# Patient Record
Sex: FEMALE | Race: WHITE | NOT HISPANIC OR LATINO
[De-identification: names, ages, dates, MRNs, and addresses within clinical notes are randomized per-mention and may not be internally consistent; named-entity substitution may affect disease eponyms.]

---

## 2018-01-21 PROBLEM — Z00.00 ENCOUNTER FOR PREVENTIVE HEALTH EXAMINATION: Status: ACTIVE | Noted: 2018-01-21

## 2018-01-24 ENCOUNTER — RECORD ABSTRACTING (OUTPATIENT)
Age: 63
End: 2018-01-24

## 2018-01-24 DIAGNOSIS — Z78.9 OTHER SPECIFIED HEALTH STATUS: ICD-10-CM

## 2018-01-24 DIAGNOSIS — Z82.49 FAMILY HISTORY OF ISCHEMIC HEART DISEASE AND OTHER DISEASES OF THE CIRCULATORY SYSTEM: ICD-10-CM

## 2018-01-24 DIAGNOSIS — Z91.89 OTHER SPECIFIED PERSONAL RISK FACTORS, NOT ELSEWHERE CLASSIFIED: ICD-10-CM

## 2018-01-24 DIAGNOSIS — G47.00 INSOMNIA, UNSPECIFIED: ICD-10-CM

## 2018-05-01 ENCOUNTER — RECORD ABSTRACTING (OUTPATIENT)
Age: 63
End: 2018-05-01

## 2018-05-01 ENCOUNTER — APPOINTMENT (OUTPATIENT)
Dept: CARDIOLOGY | Facility: CLINIC | Age: 63
End: 2018-05-01
Payer: COMMERCIAL

## 2018-05-01 VITALS
BODY MASS INDEX: 25.27 KG/M2 | HEART RATE: 77 BPM | WEIGHT: 148 LBS | DIASTOLIC BLOOD PRESSURE: 74 MMHG | HEIGHT: 64 IN | RESPIRATION RATE: 14 BRPM | SYSTOLIC BLOOD PRESSURE: 122 MMHG

## 2018-05-01 PROCEDURE — 99214 OFFICE O/P EST MOD 30 MIN: CPT

## 2018-05-01 PROCEDURE — 93000 ELECTROCARDIOGRAM COMPLETE: CPT

## 2018-05-01 RX ORDER — MULTIVITAMIN
CAPSULE ORAL
Refills: 0 | Status: ACTIVE | COMMUNITY

## 2018-05-01 RX ORDER — ZOLPIDEM TARTRATE 5 MG/1
5 TABLET, FILM COATED ORAL
Refills: 0 | Status: ACTIVE | COMMUNITY

## 2018-05-03 ENCOUNTER — MEDICATION RENEWAL (OUTPATIENT)
Age: 63
End: 2018-05-03

## 2018-10-01 ENCOUNTER — RX RENEWAL (OUTPATIENT)
Age: 63
End: 2018-10-01

## 2018-11-01 ENCOUNTER — APPOINTMENT (OUTPATIENT)
Dept: CARDIOLOGY | Facility: CLINIC | Age: 63
End: 2018-11-01
Payer: COMMERCIAL

## 2018-11-01 VITALS
DIASTOLIC BLOOD PRESSURE: 64 MMHG | HEIGHT: 64 IN | BODY MASS INDEX: 24.75 KG/M2 | SYSTOLIC BLOOD PRESSURE: 128 MMHG | HEART RATE: 83 BPM | WEIGHT: 145 LBS | RESPIRATION RATE: 14 BRPM

## 2018-11-01 DIAGNOSIS — R07.9 CHEST PAIN, UNSPECIFIED: ICD-10-CM

## 2018-11-01 DIAGNOSIS — Z86.79 PERSONAL HISTORY OF OTHER DISEASES OF THE CIRCULATORY SYSTEM: ICD-10-CM

## 2018-11-01 PROCEDURE — 93000 ELECTROCARDIOGRAM COMPLETE: CPT

## 2018-11-01 PROCEDURE — 99214 OFFICE O/P EST MOD 30 MIN: CPT

## 2018-11-01 RX ORDER — ROSUVASTATIN CALCIUM 10 MG/1
10 TABLET, FILM COATED ORAL
Qty: 90 | Refills: 3 | Status: DISCONTINUED | COMMUNITY
Start: 2018-10-01 | End: 2018-11-01

## 2018-12-24 ENCOUNTER — RX RENEWAL (OUTPATIENT)
Age: 63
End: 2018-12-24

## 2019-03-12 ENCOUNTER — APPOINTMENT (OUTPATIENT)
Dept: CARDIOLOGY | Facility: CLINIC | Age: 64
End: 2019-03-12
Payer: COMMERCIAL

## 2019-03-12 PROCEDURE — 93306 TTE W/DOPPLER COMPLETE: CPT

## 2019-03-12 PROCEDURE — 93880 EXTRACRANIAL BILAT STUDY: CPT

## 2019-04-08 ENCOUNTER — NON-APPOINTMENT (OUTPATIENT)
Age: 64
End: 2019-04-08

## 2019-04-08 ENCOUNTER — APPOINTMENT (OUTPATIENT)
Dept: CARDIOLOGY | Facility: CLINIC | Age: 64
End: 2019-04-08
Payer: COMMERCIAL

## 2019-04-08 VITALS
SYSTOLIC BLOOD PRESSURE: 130 MMHG | BODY MASS INDEX: 25.1 KG/M2 | HEIGHT: 64 IN | WEIGHT: 147 LBS | DIASTOLIC BLOOD PRESSURE: 78 MMHG | RESPIRATION RATE: 14 BRPM | HEART RATE: 87 BPM

## 2019-04-08 PROCEDURE — 93000 ELECTROCARDIOGRAM COMPLETE: CPT

## 2019-04-08 PROCEDURE — 99214 OFFICE O/P EST MOD 30 MIN: CPT

## 2019-04-08 RX ORDER — CALCIUM 250 MG
TABLET ORAL
Refills: 0 | Status: ACTIVE | COMMUNITY

## 2019-04-08 NOTE — REASON FOR VISIT
[Follow-Up - Clinic] : a clinic follow-up of [FreeTextEntry1] : The patient is a 63-year-old woman who has a history of mild carotid plaquing stenosis and hypertension as well as hyperlipidemia who presents back to the office today for general cardiac checkup;\par \par She reports that she has been largely asymptomatic for chest pain, shortness of breath, palpitations, dizziness or syncope;\par \par

## 2019-04-08 NOTE — ASSESSMENT
[FreeTextEntry1] : EKG demonstrates normal sinus rhythm at a rate 87. There is some nonspecific ST-T changes but nonacute and unchanged from previous;\par \par Transthoracic echo study demonstrated normal preserved cardiac chambers and normal systolic function with LV EF of 60%. Mild aortic sclerosis without stenosis, mild thickening of the mitral leaflets no significant valvulopathy seen;\par \par Carotid duplex study from 3/12/19 demonstrates mild carotid plaquing stenosis on the left with mild- moderate heterogeneous plaquing on the right, with normal flow;\par \par in summary the patient is a 63-year-old woman who has a history of hypertension and hyperlipidemia which has been stable on her current medical regimen;\par \par \par Plan:\par \par Patient encouraged to continue current medical regimen\par \par No additional cardiac workup indicated at this time, however discuss possibility of scheduling a nuclear stress test at next visit sometime later in the fall;\par \par Followup in this office within 6 months or p.r.n.\par \par ;Patient to report any untoward chest symptoms between now and next visit;;\par

## 2019-04-08 NOTE — HISTORY OF PRESENT ILLNESS
[FreeTextEntry1] : She also underwent a recent echocardiogram and carotid duplex study here to discuss those results;

## 2019-04-08 NOTE — PHYSICAL EXAM
[General Appearance - Well Developed] : well developed [Normal Appearance] : normal appearance [Well Groomed] : well groomed [General Appearance - Well Nourished] : well nourished [No Deformities] : no deformities [General Appearance - In No Acute Distress] : no acute distress [Normal Conjunctiva] : the conjunctiva exhibited no abnormalities [Eyelids - No Xanthelasma] : the eyelids demonstrated no xanthelasmas [Normal Oral Mucosa] : normal oral mucosa [No Oral Pallor] : no oral pallor [No Oral Cyanosis] : no oral cyanosis [Normal Jugular Venous A Waves Present] : normal jugular venous A waves present [Normal Jugular Venous V Waves Present] : normal jugular venous V waves present [No Jugular Venous Damian A Waves] : no jugular venous damian A waves [Respiration, Rhythm And Depth] : normal respiratory rhythm and effort [Exaggerated Use Of Accessory Muscles For Inspiration] : no accessory muscle use [Auscultation Breath Sounds / Voice Sounds] : lungs were clear to auscultation bilaterally [FreeTextEntry1] : Regular rhythm grade 1/6 systolic murmur [Abdomen Soft] : soft [Abdomen Tenderness] : non-tender [Abdomen Mass (___ Cm)] : no abdominal mass palpated [Abnormal Walk] : normal gait [Gait - Sufficient For Exercise Testing] : the gait was sufficient for exercise testing [Nail Clubbing] : no clubbing of the fingernails [Cyanosis, Localized] : no localized cyanosis [Petechial Hemorrhages (___cm)] : no petechial hemorrhages [Skin Color & Pigmentation] : normal skin color and pigmentation [] : no rash [No Venous Stasis] : no venous stasis [Skin Lesions] : no skin lesions [No Skin Ulcers] : no skin ulcer [No Xanthoma] : no  xanthoma was observed [Oriented To Time, Place, And Person] : oriented to person, place, and time [Affect] : the affect was normal [Mood] : the mood was normal [No Anxiety] : not feeling anxious

## 2019-06-13 ENCOUNTER — MEDICATION RENEWAL (OUTPATIENT)
Age: 64
End: 2019-06-13

## 2019-08-13 ENCOUNTER — APPOINTMENT (OUTPATIENT)
Dept: COLORECTAL SURGERY | Facility: CLINIC | Age: 64
End: 2019-08-13
Payer: COMMERCIAL

## 2019-08-13 VITALS
WEIGHT: 144 LBS | DIASTOLIC BLOOD PRESSURE: 79 MMHG | HEIGHT: 64 IN | RESPIRATION RATE: 14 BRPM | BODY MASS INDEX: 24.59 KG/M2 | SYSTOLIC BLOOD PRESSURE: 122 MMHG | HEART RATE: 66 BPM

## 2019-08-13 DIAGNOSIS — Z87.448 PERSONAL HISTORY OF OTHER DISEASES OF URINARY SYSTEM: ICD-10-CM

## 2019-08-13 DIAGNOSIS — Z12.11 ENCOUNTER FOR SCREENING FOR MALIGNANT NEOPLASM OF COLON: ICD-10-CM

## 2019-08-13 DIAGNOSIS — Z87.440 PERSONAL HISTORY OF URINARY (TRACT) INFECTIONS: ICD-10-CM

## 2019-08-13 PROCEDURE — 99243 OFF/OP CNSLTJ NEW/EST LOW 30: CPT

## 2019-08-13 NOTE — CONSULT LETTER
[Dear  ___] : Dear  [unfilled], [Consult Letter:] : I had the pleasure of evaluating your patient, [unfilled]. Sacral pressure injury - discontinue Dakin's 1/4 strength daily dressing. Cleanse wound with wound cleanser, pack lightly with Aquacel AG, cover with foam dressing daily.   Discussed assessment with [facility] staff, Dr Garcia. [Please see my note below.] : Please see my note below. Sacral pressure injury - discontinue Dakin's 1/4 strength daily dressing. Irrigate wound with wound cleanser, apply Cavilon liquid skin barrier to periwound, pack wound lightly with Aquacel AG, cover with foam dressing daily.   Discussed assessment with [facility] staff, Dr Garcia. [Consult Closing:] : Thank you very much for allowing me to participate in the care of this patient.  If you have any questions, please do not hesitate to contact me. [Sincerely,] : Sincerely, [FreeTextEntry1] : She is in need of a screening colonoscopy which will be scheduled in the near future and you would be notified of the results. [FreeTextEntry3] : Toby High MD\par

## 2019-08-13 NOTE — REVIEW OF SYSTEMS
[Negative] : Heme/Lymph [Anxiety] : no anxiety [de-identified] : insomnia [FreeTextEntry6] : sleep apnea

## 2019-08-13 NOTE — HISTORY OF PRESENT ILLNESS
[FreeTextEntry1] : 64-year-old female who presents for consultation for screening colonoscopy. She denies any gastrointestinal symptoms such as abdominal pain, nausea, vomiting, changes in bowel movements, melena or hematochezia. She has never had a colonoscopy.

## 2019-08-13 NOTE — PHYSICAL EXAM
[Respiratory Effort] : normal respiratory effort [Normal Rate and Rhythm] : normal rate and rhythm [Calm] : calm [de-identified] : soft, nontender, nondistended. No mass or hernia appreciated. [de-identified] : normocephalic, atraumatic [de-identified] : well appearing, in no distress [de-identified] : moves extremities without difficulty [de-identified] : warm and dry [de-identified] : alert and oriented x3

## 2019-09-17 ENCOUNTER — RX RENEWAL (OUTPATIENT)
Age: 64
End: 2019-09-17

## 2019-09-26 ENCOUNTER — NON-APPOINTMENT (OUTPATIENT)
Age: 64
End: 2019-09-26

## 2019-09-26 ENCOUNTER — APPOINTMENT (OUTPATIENT)
Dept: CARDIOLOGY | Facility: CLINIC | Age: 64
End: 2019-09-26
Payer: COMMERCIAL

## 2019-09-26 VITALS
HEIGHT: 64 IN | RESPIRATION RATE: 16 BRPM | DIASTOLIC BLOOD PRESSURE: 73 MMHG | BODY MASS INDEX: 24.75 KG/M2 | WEIGHT: 145 LBS | SYSTOLIC BLOOD PRESSURE: 121 MMHG | HEART RATE: 76 BPM

## 2019-09-26 PROCEDURE — 93000 ELECTROCARDIOGRAM COMPLETE: CPT

## 2019-09-26 PROCEDURE — 99214 OFFICE O/P EST MOD 30 MIN: CPT

## 2019-09-26 NOTE — HISTORY OF PRESENT ILLNESS
[FreeTextEntry1] : Patient reminds me she also has a family history for heart disease for her mother.\par \par She is tolerating her current medical regimen without difficulty;\par \par Additional somatic complaints include symptoms of sciatica and bursitis which she is dealing with as well;;\par \par

## 2019-09-26 NOTE — REASON FOR VISIT
[Follow-Up - Clinic] : a clinic follow-up of [FreeTextEntry1] : The patient is a 64-year-old white female with a known history for mild carotid plaquing, mild hypertension and hyperlipidemia who presents back to the office today for general cardiac checkup\par \par Recently, she was recommended to start on a CPAP machine for her history of mild obstructive sleep apnea with superimposed insomnia issues.\par She is trying to get used to this.\par \par She denies any recent symptoms of chest pain but gets occasional exertional dyspnea. No palpitations, dizziness or syncope;;;

## 2019-09-26 NOTE — PHYSICAL EXAM
[General Appearance - Well Developed] : well developed [Normal Appearance] : normal appearance [Well Groomed] : well groomed [General Appearance - Well Nourished] : well nourished [No Deformities] : no deformities [General Appearance - In No Acute Distress] : no acute distress [Normal Conjunctiva] : the conjunctiva exhibited no abnormalities [Eyelids - No Xanthelasma] : the eyelids demonstrated no xanthelasmas [Normal Oral Mucosa] : normal oral mucosa [No Oral Pallor] : no oral pallor [Normal Jugular Venous A Waves Present] : normal jugular venous A waves present [No Oral Cyanosis] : no oral cyanosis [Normal Jugular Venous V Waves Present] : normal jugular venous V waves present [No Jugular Venous Damian A Waves] : no jugular venous damian A waves [Respiration, Rhythm And Depth] : normal respiratory rhythm and effort [Exaggerated Use Of Accessory Muscles For Inspiration] : no accessory muscle use [Auscultation Breath Sounds / Voice Sounds] : lungs were clear to auscultation bilaterally [FreeTextEntry1] : Regular rhythm grade 1/6 systolic murmur [Abdomen Soft] : soft [Abdomen Tenderness] : non-tender [Abdomen Mass (___ Cm)] : no abdominal mass palpated [Abnormal Walk] : normal gait [Nail Clubbing] : no clubbing of the fingernails [Gait - Sufficient For Exercise Testing] : the gait was sufficient for exercise testing [Cyanosis, Localized] : no localized cyanosis [Petechial Hemorrhages (___cm)] : no petechial hemorrhages [Skin Color & Pigmentation] : normal skin color and pigmentation [] : no rash [No Venous Stasis] : no venous stasis [Skin Lesions] : no skin lesions [No Skin Ulcers] : no skin ulcer [No Xanthoma] : no  xanthoma was observed [Oriented To Time, Place, And Person] : oriented to person, place, and time [Mood] : the mood was normal [Affect] : the affect was normal [No Anxiety] : not feeling anxious

## 2019-10-29 ENCOUNTER — OUTPATIENT (OUTPATIENT)
Dept: OUTPATIENT SERVICES | Facility: HOSPITAL | Age: 64
LOS: 1 days | End: 2019-10-29
Payer: COMMERCIAL

## 2019-10-29 VITALS
DIASTOLIC BLOOD PRESSURE: 73 MMHG | OXYGEN SATURATION: 99 % | HEART RATE: 67 BPM | TEMPERATURE: 98 F | SYSTOLIC BLOOD PRESSURE: 164 MMHG | WEIGHT: 139.99 LBS | RESPIRATION RATE: 20 BRPM | HEIGHT: 64 IN

## 2019-10-29 DIAGNOSIS — Z01.818 ENCOUNTER FOR OTHER PREPROCEDURAL EXAMINATION: ICD-10-CM

## 2019-10-29 DIAGNOSIS — Z98.890 OTHER SPECIFIED POSTPROCEDURAL STATES: Chronic | ICD-10-CM

## 2019-10-29 DIAGNOSIS — Z12.11 ENCOUNTER FOR SCREENING FOR MALIGNANT NEOPLASM OF COLON: ICD-10-CM

## 2019-10-29 DIAGNOSIS — Z90.89 ACQUIRED ABSENCE OF OTHER ORGANS: Chronic | ICD-10-CM

## 2019-10-29 LAB
ANION GAP SERPL CALC-SCNC: 4 MMOL/L — LOW (ref 5–17)
APTT BLD: 33.1 SEC — SIGNIFICANT CHANGE UP (ref 27.5–36.3)
BASOPHILS # BLD AUTO: 0.05 K/UL — SIGNIFICANT CHANGE UP (ref 0–0.2)
BASOPHILS NFR BLD AUTO: 0.7 % — SIGNIFICANT CHANGE UP (ref 0–2)
BUN SERPL-MCNC: 21 MG/DL — SIGNIFICANT CHANGE UP (ref 7–23)
CALCIUM SERPL-MCNC: 9.5 MG/DL — SIGNIFICANT CHANGE UP (ref 8.5–10.1)
CHLORIDE SERPL-SCNC: 107 MMOL/L — SIGNIFICANT CHANGE UP (ref 96–108)
CO2 SERPL-SCNC: 29 MMOL/L — SIGNIFICANT CHANGE UP (ref 22–31)
CREAT SERPL-MCNC: 1.04 MG/DL — SIGNIFICANT CHANGE UP (ref 0.5–1.3)
EOSINOPHIL # BLD AUTO: 0.11 K/UL — SIGNIFICANT CHANGE UP (ref 0–0.5)
EOSINOPHIL NFR BLD AUTO: 1.6 % — SIGNIFICANT CHANGE UP (ref 0–6)
GLUCOSE SERPL-MCNC: 111 MG/DL — HIGH (ref 70–99)
HCT VFR BLD CALC: 39.8 % — SIGNIFICANT CHANGE UP (ref 34.5–45)
HGB BLD-MCNC: 13.6 G/DL — SIGNIFICANT CHANGE UP (ref 11.5–15.5)
IMM GRANULOCYTES NFR BLD AUTO: 0.3 % — SIGNIFICANT CHANGE UP (ref 0–1.5)
INR BLD: 0.94 RATIO — SIGNIFICANT CHANGE UP (ref 0.88–1.16)
LYMPHOCYTES # BLD AUTO: 1.3 K/UL — SIGNIFICANT CHANGE UP (ref 1–3.3)
LYMPHOCYTES # BLD AUTO: 19 % — SIGNIFICANT CHANGE UP (ref 13–44)
MCHC RBC-ENTMCNC: 31.6 PG — SIGNIFICANT CHANGE UP (ref 27–34)
MCHC RBC-ENTMCNC: 34.2 GM/DL — SIGNIFICANT CHANGE UP (ref 32–36)
MCV RBC AUTO: 92.6 FL — SIGNIFICANT CHANGE UP (ref 80–100)
MONOCYTES # BLD AUTO: 0.49 K/UL — SIGNIFICANT CHANGE UP (ref 0–0.9)
MONOCYTES NFR BLD AUTO: 7.2 % — SIGNIFICANT CHANGE UP (ref 2–14)
NEUTROPHILS # BLD AUTO: 4.87 K/UL — SIGNIFICANT CHANGE UP (ref 1.8–7.4)
NEUTROPHILS NFR BLD AUTO: 71.2 % — SIGNIFICANT CHANGE UP (ref 43–77)
PLATELET # BLD AUTO: 238 K/UL — SIGNIFICANT CHANGE UP (ref 150–400)
POTASSIUM SERPL-MCNC: 3.9 MMOL/L — SIGNIFICANT CHANGE UP (ref 3.5–5.3)
POTASSIUM SERPL-SCNC: 3.9 MMOL/L — SIGNIFICANT CHANGE UP (ref 3.5–5.3)
PROTHROM AB SERPL-ACNC: 10.4 SEC — SIGNIFICANT CHANGE UP (ref 10–12.9)
RBC # BLD: 4.3 M/UL — SIGNIFICANT CHANGE UP (ref 3.8–5.2)
RBC # FLD: 12.7 % — SIGNIFICANT CHANGE UP (ref 10.3–14.5)
SODIUM SERPL-SCNC: 140 MMOL/L — SIGNIFICANT CHANGE UP (ref 135–145)
WBC # BLD: 6.84 K/UL — SIGNIFICANT CHANGE UP (ref 3.8–10.5)
WBC # FLD AUTO: 6.84 K/UL — SIGNIFICANT CHANGE UP (ref 3.8–10.5)

## 2019-10-29 PROCEDURE — 85730 THROMBOPLASTIN TIME PARTIAL: CPT

## 2019-10-29 PROCEDURE — G0463: CPT | Mod: 25

## 2019-10-29 PROCEDURE — 93010 ELECTROCARDIOGRAM REPORT: CPT

## 2019-10-29 PROCEDURE — 36415 COLL VENOUS BLD VENIPUNCTURE: CPT

## 2019-10-29 PROCEDURE — 80048 BASIC METABOLIC PNL TOTAL CA: CPT

## 2019-10-29 PROCEDURE — 85025 COMPLETE CBC W/AUTO DIFF WBC: CPT

## 2019-10-29 PROCEDURE — 93005 ELECTROCARDIOGRAM TRACING: CPT

## 2019-10-29 PROCEDURE — 85610 PROTHROMBIN TIME: CPT

## 2019-10-29 NOTE — H&P PST ADULT - NSICDXPASTMEDICALHX_GEN_ALL_CORE_FT
PAST MEDICAL HISTORY:  GERD (gastroesophageal reflux disease)     H/O sciatica     HTN (hypertension)     Hypercholesterolemia     Lumbar disc herniation     Migraine     Sleep apnea CPAP

## 2019-10-29 NOTE — H&P PST ADULT - ASSESSMENT
Plan:  1. NPO post midnight  2. Follow bowel prep instructions from Dr. High  3. Follow preop medication instructions from Dr. High  4. Labs, EKG as per Dr. High 63 y/o female scheduled for screening colonoscopy.  Plan:  1. NPO post midnight  2. Follow bowel prep instructions from Dr. High  3. Follow preop medication instructions from Dr. High  4. Labs, EKG as per Dr. High

## 2019-10-29 NOTE — H&P PST ADULT - EXTREMITIES
Duration Of Freeze Thaw-Cycle (Seconds): 10
Render Post-Care Instructions In Note?: no
Consent: The patient's consent was obtained including but not limited to risks of crusting, scabbing, blistering, scarring, darker or lighter pigmentary change, recurrence, incomplete removal and infection.
Post-Care Instructions: I reviewed with the patient in detail post-care instructions. Patient is to wear sunprotection, and avoid picking at any of the treated lesions. Pt may apply Vaseline to crusted or scabbing areas.
Number Of Freeze-Thaw Cycles: 2 freeze-thaw cycles
Detail Level: Detailed
No cyanosis, clubbing or edema

## 2019-10-29 NOTE — H&P PST ADULT - WEIGHT IN KG
Shyam Craig, I am forwarding my most recent visit note for Pawel Barajas as an update. She is doing better, but still has a way to go. Boni 64.5

## 2019-10-29 NOTE — H&P PST ADULT - NSICDXPASTSURGICALHX_GEN_ALL_CORE_FT
PAST SURGICAL HISTORY:  H/O arthroscopy of right knee     H/O breast biopsy 2003 left    S/P rotator cuff repair left    S/P tonsillectomy 2008

## 2019-10-29 NOTE — H&P PST ADULT - NSICDXFAMILYHX_GEN_ALL_CORE_FT
FAMILY HISTORY:  Family history of diabetes mellitus (DM), father and sister  Family history of heart attack, mother  Family history of lymphoma, father  Family history of skin cancer, brother

## 2019-10-30 DIAGNOSIS — Z12.11 ENCOUNTER FOR SCREENING FOR MALIGNANT NEOPLASM OF COLON: ICD-10-CM

## 2019-10-30 DIAGNOSIS — Z01.818 ENCOUNTER FOR OTHER PREPROCEDURAL EXAMINATION: ICD-10-CM

## 2019-11-04 PROBLEM — M51.26 OTHER INTERVERTEBRAL DISC DISPLACEMENT, LUMBAR REGION: Chronic | Status: ACTIVE | Noted: 2019-10-29

## 2019-11-04 PROBLEM — G43.909 MIGRAINE, UNSPECIFIED, NOT INTRACTABLE, WITHOUT STATUS MIGRAINOSUS: Chronic | Status: ACTIVE | Noted: 2019-10-29

## 2019-11-04 PROBLEM — Z86.69 PERSONAL HISTORY OF OTHER DISEASES OF THE NERVOUS SYSTEM AND SENSE ORGANS: Chronic | Status: ACTIVE | Noted: 2019-10-29

## 2019-11-04 PROBLEM — K21.9 GASTRO-ESOPHAGEAL REFLUX DISEASE WITHOUT ESOPHAGITIS: Chronic | Status: ACTIVE | Noted: 2019-10-29

## 2019-11-04 PROBLEM — G47.30 SLEEP APNEA, UNSPECIFIED: Chronic | Status: ACTIVE | Noted: 2019-10-29

## 2019-11-04 PROBLEM — I10 ESSENTIAL (PRIMARY) HYPERTENSION: Chronic | Status: ACTIVE | Noted: 2019-10-29

## 2019-11-04 PROBLEM — E78.00 PURE HYPERCHOLESTEROLEMIA, UNSPECIFIED: Chronic | Status: ACTIVE | Noted: 2019-10-29

## 2019-11-13 ENCOUNTER — RESULT REVIEW (OUTPATIENT)
Age: 64
End: 2019-11-13

## 2019-11-13 ENCOUNTER — APPOINTMENT (OUTPATIENT)
Dept: COLORECTAL SURGERY | Facility: HOSPITAL | Age: 64
End: 2019-11-13
Payer: COMMERCIAL

## 2019-11-13 ENCOUNTER — OUTPATIENT (OUTPATIENT)
Dept: OUTPATIENT SERVICES | Facility: HOSPITAL | Age: 64
LOS: 1 days | Discharge: HOME | End: 2019-11-13
Payer: COMMERCIAL

## 2019-11-13 VITALS
TEMPERATURE: 98 F | OXYGEN SATURATION: 99 % | DIASTOLIC BLOOD PRESSURE: 83 MMHG | WEIGHT: 136.91 LBS | SYSTOLIC BLOOD PRESSURE: 129 MMHG | HEIGHT: 64 IN | HEART RATE: 69 BPM | RESPIRATION RATE: 26 BRPM

## 2019-11-13 DIAGNOSIS — Z98.890 OTHER SPECIFIED POSTPROCEDURAL STATES: Chronic | ICD-10-CM

## 2019-11-13 DIAGNOSIS — Z90.89 ACQUIRED ABSENCE OF OTHER ORGANS: Chronic | ICD-10-CM

## 2019-11-13 DIAGNOSIS — Z12.11 ENCOUNTER FOR SCREENING FOR MALIGNANT NEOPLASM OF COLON: ICD-10-CM

## 2019-11-13 PROCEDURE — 88305 TISSUE EXAM BY PATHOLOGIST: CPT

## 2019-11-13 PROCEDURE — 88305 TISSUE EXAM BY PATHOLOGIST: CPT | Mod: 26

## 2019-11-13 PROCEDURE — 45385 COLONOSCOPY W/LESION REMOVAL: CPT

## 2019-11-13 RX ORDER — ASCORBIC ACID 60 MG
1 TABLET,CHEWABLE ORAL
Qty: 0 | Refills: 0 | DISCHARGE

## 2019-11-13 RX ORDER — ZOLPIDEM TARTRATE 10 MG/1
1 TABLET ORAL
Qty: 0 | Refills: 0 | DISCHARGE

## 2019-11-13 RX ORDER — AMLODIPINE BESYLATE AND BENAZEPRIL HYDROCHLORIDE 10; 20 MG/1; MG/1
1 CAPSULE ORAL
Qty: 0 | Refills: 0 | DISCHARGE

## 2019-11-13 RX ORDER — CHOLECALCIFEROL (VITAMIN D3) 125 MCG
1 CAPSULE ORAL
Qty: 0 | Refills: 0 | DISCHARGE

## 2019-11-13 RX ORDER — SODIUM CHLORIDE 9 MG/ML
1000 INJECTION INTRAMUSCULAR; INTRAVENOUS; SUBCUTANEOUS
Refills: 0 | Status: DISCONTINUED | OUTPATIENT
Start: 2019-11-13 | End: 2019-11-13

## 2019-11-13 RX ORDER — ROSUVASTATIN CALCIUM 5 MG/1
1 TABLET ORAL
Qty: 0 | Refills: 0 | DISCHARGE

## 2019-11-13 NOTE — ASU PATIENT PROFILE, ADULT - PSH
H/O arthroscopy of right knee    H/O breast biopsy  2003 left  S/P rotator cuff repair  left  S/P tonsillectomy  2008

## 2019-11-13 NOTE — ASU PATIENT PROFILE, ADULT - PMH
GERD (gastroesophageal reflux disease)    H/O sciatica    HTN (hypertension)    Hypercholesterolemia    Lumbar disc herniation    Migraine    Sleep apnea  CPAP

## 2019-11-16 DIAGNOSIS — Z12.11 ENCOUNTER FOR SCREENING FOR MALIGNANT NEOPLASM OF COLON: ICD-10-CM

## 2019-11-16 DIAGNOSIS — M51.36 OTHER INTERVERTEBRAL DISC DEGENERATION, LUMBAR REGION: ICD-10-CM

## 2019-11-16 DIAGNOSIS — G47.33 OBSTRUCTIVE SLEEP APNEA (ADULT) (PEDIATRIC): ICD-10-CM

## 2019-11-16 DIAGNOSIS — I10 ESSENTIAL (PRIMARY) HYPERTENSION: ICD-10-CM

## 2019-11-16 DIAGNOSIS — D12.5 BENIGN NEOPLASM OF SIGMOID COLON: ICD-10-CM

## 2019-11-16 DIAGNOSIS — G43.909 MIGRAINE, UNSPECIFIED, NOT INTRACTABLE, WITHOUT STATUS MIGRAINOSUS: ICD-10-CM

## 2019-11-16 DIAGNOSIS — K21.9 GASTRO-ESOPHAGEAL REFLUX DISEASE WITHOUT ESOPHAGITIS: ICD-10-CM

## 2019-11-16 DIAGNOSIS — E78.00 PURE HYPERCHOLESTEROLEMIA, UNSPECIFIED: ICD-10-CM

## 2019-11-16 DIAGNOSIS — Z88.0 ALLERGY STATUS TO PENICILLIN: ICD-10-CM

## 2019-11-16 DIAGNOSIS — K57.30 DIVERTICULOSIS OF LARGE INTESTINE WITHOUT PERFORATION OR ABSCESS WITHOUT BLEEDING: ICD-10-CM

## 2019-11-16 DIAGNOSIS — D12.0 BENIGN NEOPLASM OF CECUM: ICD-10-CM

## 2019-11-16 DIAGNOSIS — K64.4 RESIDUAL HEMORRHOIDAL SKIN TAGS: ICD-10-CM

## 2019-11-20 ENCOUNTER — OTHER (OUTPATIENT)
Age: 64
End: 2019-11-20

## 2019-12-12 ENCOUNTER — APPOINTMENT (OUTPATIENT)
Dept: CARDIOLOGY | Facility: CLINIC | Age: 64
End: 2019-12-12
Payer: COMMERCIAL

## 2019-12-12 PROCEDURE — A9500: CPT

## 2019-12-12 PROCEDURE — 78452 HT MUSCLE IMAGE SPECT MULT: CPT

## 2019-12-12 PROCEDURE — 93015 CV STRESS TEST SUPVJ I&R: CPT

## 2019-12-18 RX ORDER — KIT FOR THE PREPARATION OF TECHNETIUM TC99M SESTAMIBI 1 MG/5ML
INJECTION, POWDER, LYOPHILIZED, FOR SOLUTION PARENTERAL
Refills: 0 | Status: COMPLETED | OUTPATIENT
Start: 2019-12-18

## 2019-12-18 RX ADMIN — KIT FOR THE PREPARATION OF TECHNETIUM TC99M SESTAMIBI 0: 1 INJECTION, POWDER, LYOPHILIZED, FOR SOLUTION PARENTERAL at 00:00

## 2020-03-13 ENCOUNTER — RX RENEWAL (OUTPATIENT)
Age: 65
End: 2020-03-13

## 2020-06-08 ENCOUNTER — RX RENEWAL (OUTPATIENT)
Age: 65
End: 2020-06-08

## 2020-09-03 ENCOUNTER — NON-APPOINTMENT (OUTPATIENT)
Age: 65
End: 2020-09-03

## 2020-09-03 ENCOUNTER — APPOINTMENT (OUTPATIENT)
Dept: CARDIOLOGY | Facility: CLINIC | Age: 65
End: 2020-09-03
Payer: COMMERCIAL

## 2020-09-03 VITALS
HEIGHT: 65 IN | WEIGHT: 128 LBS | DIASTOLIC BLOOD PRESSURE: 70 MMHG | RESPIRATION RATE: 16 BRPM | HEART RATE: 80 BPM | TEMPERATURE: 98.3 F | SYSTOLIC BLOOD PRESSURE: 139 MMHG | BODY MASS INDEX: 21.33 KG/M2

## 2020-09-03 PROCEDURE — 93000 ELECTROCARDIOGRAM COMPLETE: CPT

## 2020-09-03 PROCEDURE — 99214 OFFICE O/P EST MOD 30 MIN: CPT

## 2020-09-03 NOTE — PHYSICAL EXAM
[Normal Appearance] : normal appearance [General Appearance - Well Developed] : well developed [Well Groomed] : well groomed [General Appearance - Well Nourished] : well nourished [Normal Conjunctiva] : the conjunctiva exhibited no abnormalities [General Appearance - In No Acute Distress] : no acute distress [No Deformities] : no deformities [Eyelids - No Xanthelasma] : the eyelids demonstrated no xanthelasmas [Normal Oral Mucosa] : normal oral mucosa [No Oral Pallor] : no oral pallor [No Oral Cyanosis] : no oral cyanosis [Normal Jugular Venous V Waves Present] : normal jugular venous V waves present [Normal Jugular Venous A Waves Present] : normal jugular venous A waves present [No Jugular Venous Damian A Waves] : no jugular venous damian A waves [Respiration, Rhythm And Depth] : normal respiratory rhythm and effort [Exaggerated Use Of Accessory Muscles For Inspiration] : no accessory muscle use [Auscultation Breath Sounds / Voice Sounds] : lungs were clear to auscultation bilaterally [Abdomen Soft] : soft [FreeTextEntry1] : Regular rhythm grade 1/6 systolic murmur [Abdomen Tenderness] : non-tender [Abdomen Mass (___ Cm)] : no abdominal mass palpated [Gait - Sufficient For Exercise Testing] : the gait was sufficient for exercise testing [Abnormal Walk] : normal gait [Nail Clubbing] : no clubbing of the fingernails [Cyanosis, Localized] : no localized cyanosis [Petechial Hemorrhages (___cm)] : no petechial hemorrhages [Skin Color & Pigmentation] : normal skin color and pigmentation [] : no ischemic changes [Skin Lesions] : no skin lesions [No Venous Stasis] : no venous stasis [No Skin Ulcers] : no skin ulcer [No Xanthoma] : no  xanthoma was observed [Affect] : the affect was normal [Mood] : the mood was normal [Oriented To Time, Place, And Person] : oriented to person, place, and time [No Anxiety] : not feeling anxious

## 2020-09-03 NOTE — HISTORY OF PRESENT ILLNESS
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

## 2020-09-03 NOTE — ASSESSMENT
[FreeTextEntry1] : EKG shows normal sinus rhythm at a rate of 80 with poor R-wave progression V1 to V3 and no acute changes\par \par In summary the patient is a 65-year-old woman who has had a stable cardiac pattern with mild hypertension and hyperlipidemia and has achieved significant weight loss goals on both heart diet;\par \par Last cardiac stress test from December 2018 was negative for ST abnormalities or ischemia;;\par \par \par Plan:\par \par No additional cardiac workup indicated at this time\par \par Patient encouraged to continue current nutritional loss plan exercise regimen;\par \par Followup to this office within 6 months or p.r.n.\par \par Timely checkups and laboratory blood tests with primary care encouraged;;;

## 2020-09-03 NOTE — REASON FOR VISIT
[Follow-Up - Clinic] : a clinic follow-up of [FreeTextEntry1] : The patient is a 65-year-old white female who presents back to the office today for general cardiac checkup no history for mild hypertension, hyperlipidemia and carotid plaquing. \par \par She is also diagnosed as obstructive sleep apnea and was previously using CPAP machine but now she states she is no longer using it after losing a great deal of weight following a low-carb diet;\par \par Overall, patient denies any significant chest pain, shortness of breath, palpitations or dizziness;

## 2020-12-21 PROBLEM — Z12.11 ENCOUNTER FOR SCREENING COLONOSCOPY: Status: RESOLVED | Noted: 2019-08-13 | Resolved: 2020-12-21

## 2021-03-05 ENCOUNTER — RX RENEWAL (OUTPATIENT)
Age: 66
End: 2021-03-05

## 2021-04-15 ENCOUNTER — APPOINTMENT (OUTPATIENT)
Dept: CARDIOLOGY | Facility: CLINIC | Age: 66
End: 2021-04-15
Payer: MEDICARE

## 2021-04-15 ENCOUNTER — NON-APPOINTMENT (OUTPATIENT)
Age: 66
End: 2021-04-15

## 2021-04-15 VITALS
WEIGHT: 132 LBS | HEIGHT: 65 IN | BODY MASS INDEX: 21.99 KG/M2 | SYSTOLIC BLOOD PRESSURE: 137 MMHG | DIASTOLIC BLOOD PRESSURE: 83 MMHG | RESPIRATION RATE: 16 BRPM | OXYGEN SATURATION: 96 % | TEMPERATURE: 98.2 F | HEART RATE: 71 BPM

## 2021-04-15 PROCEDURE — 93000 ELECTROCARDIOGRAM COMPLETE: CPT

## 2021-04-15 PROCEDURE — 99214 OFFICE O/P EST MOD 30 MIN: CPT

## 2021-04-15 NOTE — ASSESSMENT
[FreeTextEntry1] : EKG demonstrated normal sinus rhythm at a rate of 77;  general. low voltage tracing. No acute changes;\par \par In summary this 65-year-old white female has a prior history of mild hypertension and hyperlipidemia which has been well-controlled and prior cardiac workup including nuclear stress test which is normal. She is facing bilateral cataract surgery May 11 and May 25 with Dr. Parson, at [facility] [place], [place];\par \par \par Plan:\par \par Based on the patient's stable cardiac pattern, there is no absolute cardiac contraindication for the proposed surgical procedure;\par \par If I can be of additional assistance please to not hesitate to call\par \par Followup patient visit within 6 months or p.r.n.;;

## 2021-04-15 NOTE — REASON FOR VISIT
[Follow-Up - Clinic] : a clinic follow-up of [FreeTextEntry1] : The patient is a 65-year-old white female who has been evaluated in our office for a history of hyperlipidemia and hypertension with mild obstructive sleep apnea treated with weight loss who presents back to the office today for general cardiac checkup and in anticipation of cardiac clearance to undergo bilateral cataract surgery in the near future;\par \par The patient states she's been generally feeling well and has had no significant chest pain, shortness of breath, palpitations or dizziness;

## 2021-04-15 NOTE — HISTORY OF PRESENT ILLNESS
[FreeTextEntry1] : She did undergo right shoulder replacement surgery this past fall and seems to have recuperated well;\par \par Last myocardial perfusion stress test from December 2019 showed good exercise tolerance. No chest pain. Normal blood pressure response and EKG remained negative for ST abnormalities. Myocardial perfusion imaging shows normal perfusion without any evidence of ischemia-i.e. negative test;

## 2021-05-06 NOTE — H&P PST ADULT - ENMT
SUBJECTIVE  Patient is a 80y old Male who presents with a chief complaint of Bloody stools (02 May 2021 05:48)  Currently admitted to medicine with the primary diagnosis of Proctocolitis    Today is hospital day 14d, and this morning he is  and reports no overnight events.       OBJECTIVE  PAST MEDICAL & SURGICAL HISTORY  No pertinent past medical history    Amputation of digit of left hand      ALLERGIES:  No Known Allergies    MEDICATIONS:  STANDING MEDICATIONS  bisacodyl 5 milliGRAM(s) Oral daily  chlorhexidine 4% Liquid 1 Application(s) Topical <User Schedule>  dextrose 5% + sodium chloride 0.45%. 1000 milliLiter(s) IV Continuous <Continuous>  magnesium oxide 400 milliGRAM(s) Oral three times a day with meals  pantoprazole    Tablet 40 milliGRAM(s) Oral before breakfast  polyethylene glycol 3350 17 Gram(s) Oral two times a day  senna 2 Tablet(s) Oral two times a day  sodium chloride 0.9%. 1000 milliLiter(s) IV Continuous <Continuous>    PRN MEDICATIONS      VITAL SIGNS: Last 24 Hours  T(C): 36 (06 May 2021 07:39), Max: 37.2 (06 May 2021 00:48)  T(F): 96.8 (06 May 2021 07:39), Max: 99 (06 May 2021 00:48)  HR: 71 (06 May 2021 07:39) (71 - 99)  BP: 172/92 (06 May 2021 07:39) (122/58 - 172/92)  BP(mean): --  RR: 18 (06 May 2021 07:39) (18 - 19)  SpO2: --    LABS:                        10.0   9.46  )-----------( 229      ( 05 May 2021 16:00 )             35.8     05-06    137  |  102  |  13  ----------------------------<  134<H>  4.3   |  25  |  0.7    Ca    8.3<L>      06 May 2021 00:31  Mg     1.7     05-06    TPro  6.0  /  Alb  2.7<L>  /  TBili  0.5  /  DBili  x   /  AST  22  /  ALT  <5  /  AlkPhos  46  05-06    PT/INR - ( 05 May 2021 16:00 )   PT: 14.50 sec;   INR: 1.26 ratio         PTT - ( 05 May 2021 16:00 )  PTT:35.2 sec              RADIOLOGY:      PHYSICAL EXAM:    GENERAL: NAD, well-developed, AAOx3  HEENT:  Atraumatic, Normocephalic. EOMI, PERRLA, conjunctiva and sclera clear, No JVD  PULMONARY: Clear to auscultation bilaterally; No wheeze  CARDIOVASCULAR: Regular rate and rhythm; No murmurs, rubs, or gallops  GASTROINTESTINAL: Soft, Nontender, Nondistended; Bowel sounds present  MUSCULOSKELETAL:  2+ Peripheral Pulses, No clubbing, cyanosis, or edema  NEUROLOGY: non-focal  SKIN: No rashes or lesions      ADMISSION SUMMARY  80 year old male with no past medical history (has not seen a doctor in many years) presented to the ED due to loose bowel movements.    # Chronic Diarrhea --Now constipation  # Bloody Bowel Movements  # Iron deficiency anemia  # lactic acidosis -resolved  # Rectal adenocarcinoma moderate to poorly differentiated  - s/p colonoscopy 4/27  - rectal mass extending from the anus up to 17 cm proximally, multiple polyps s/p polypectomy, a 5 cm transverse colon polyp  that was not removed  - Seen by colorectal surgeon, requested colonoscopy with EMR of transverse polyp and EUS on 4/30 cancelled due to poor prep.  - MRI pelvis done, pending read. FU with surgery when read available.   - CEA 24, CA 19-9 31  - ECHO: EF 67%, Mobile echodensity associated with the lateral leflet of the tricuspid valve. Cannot rule out vegetation,MYA : No vegetations.   - Pan CT completed for staging: Well-defined right lower lobe nodule measuring 6 mm, 3 mm lingular nodule  - Mri pelvis done showed: < from: MR Pelvis w/wo IV Cont (05.01.21 @ 18:26) >  18 cm long polypoidal rectal mass with invasion of the right meso rectal fascia and possibly the prostate gland. Additional involvement of the analsphincter complex--T4(a or b)N0Mx  - heme/onc F/u  - Sx and Rad onc follow up on results of MRI  - GI : no intervention  - For constipation: increased meds: senna, bisacodyl, miralax.   - Pt is planned for Port placement 5/6, Chemo/RT afterwards.   - Possible SNF    # encephalopathy, resolved  - Appears to be at baseline  - CT head- no acute changes   - TSH, B12, folate, RPR - wnl     # Pancreatic hypodensity  - Possible cyst/IPMN  - MRI pancreatic protocol (w/wo contrast) as an outpatient    # Lung nodule   - CT:  Right lower lobe solid nodule measuring 9 mm.   - f/u OP    # Subacute Rib Fracture   - CT:  Subacute fracture of the left 10th lateral rib.   - Pain control prn     # Echo tricuspid echodensity cannot rule out vegetation  Ddx includes infective endocarditis vs metastasis  - Cardiology consulted for MYA : negative  - BCx was negative 4/21, repeated 4/30  - Afebrile    A1C 5.2, lipid panel normal    DVT PPx: SCDs  GI PPX: Protonix 40 mg PO  Diet: Regular  Activity: Increase as tolerated  Dispo: home with [facility] or SNF  Code Status: full code  negative No oral lesions; no gross abnormalities

## 2021-06-01 ENCOUNTER — RX RENEWAL (OUTPATIENT)
Age: 66
End: 2021-06-01

## 2021-10-14 ENCOUNTER — NON-APPOINTMENT (OUTPATIENT)
Age: 66
End: 2021-10-14

## 2021-10-14 ENCOUNTER — APPOINTMENT (OUTPATIENT)
Dept: CARDIOLOGY | Facility: CLINIC | Age: 66
End: 2021-10-14
Payer: MEDICARE

## 2021-10-14 VITALS
RESPIRATION RATE: 16 BRPM | DIASTOLIC BLOOD PRESSURE: 72 MMHG | BODY MASS INDEX: 21.49 KG/M2 | SYSTOLIC BLOOD PRESSURE: 122 MMHG | WEIGHT: 129 LBS | HEIGHT: 65 IN | HEART RATE: 71 BPM

## 2021-10-14 PROCEDURE — 93000 ELECTROCARDIOGRAM COMPLETE: CPT

## 2021-10-14 PROCEDURE — 99214 OFFICE O/P EST MOD 30 MIN: CPT

## 2021-10-14 NOTE — REASON FOR VISIT
[Arrhythmia/ECG Abnorrmalities] : arrhythmia/ECG abnormalities [Structural Heart and Valve Disease] : structural heart and valve disease [Hyperlipidemia] : hyperlipidemia [Carotid, Aortic and Peripheral Vascular Disease] : carotid, aortic and peripheral vascular disease [FreeTextEntry3] : THU Kaba [FreeTextEntry1] : The patient is a 66-year-old white female with a history of hyperlipidemia and hypertension, mild carotid plaquing stenosis, with mild obstructive sleep apnea who presents back to the office for general cardiac checkup today;\par \par She is generally been doing well but has had some issues over the last few months including an attack of colitis for which she was briefly hospitalized but now improved;\par \par Otherwise, there is been no significant chest pain, shortness of breath, palpitations or dizziness;

## 2021-10-14 NOTE — ASSESSMENT
[FreeTextEntry1] : EKG shows normal sinus rhythm at a rate of 71 with nonspecific T-wave flattening otherwise no acute changes;\par \par In summary this 66-year-old woman has a history for mild carotid plaquing stenosis, hyperlipidemia and hypertension which appears well controlled on current medical regimen at this time. She is also concerned about some venous insufficiency and varicose veins of the lower extremity\par \par Plan:\par \par Patient recommended to schedule carotid duplex study and echocardiogram prior to next visit within 4-5 months\par \par Discussed recommendation for vascular surgical evaluation and she may be following up in the future for this\par Elastic compression lightweight stockings recommended\par \par Continue current medical regimen;\par \par Timely checkups and laboratory blood tests with primary care encouraged;;;\par ;;

## 2021-10-14 NOTE — HISTORY OF PRESENT ILLNESS
[FreeTextEntry1] : Patient states that she has had some problems with varicosities of the lower extremities and may be following up to a vascular surgeon for additional assessment. A venous duplex study was negative for DVT she reports;\par \par last nuclear stress test performed December 20 19th showed good exercise tolerance. No symptoms of chest pain.EKG negative for arrhythmia or ST abnormality;\par Myocardial perfusion imaging showed normal radioisotope uptake without any evidence of ischemia;ie-negative test;

## 2021-12-22 ENCOUNTER — RX RENEWAL (OUTPATIENT)
Age: 66
End: 2021-12-22

## 2022-01-27 ENCOUNTER — APPOINTMENT (OUTPATIENT)
Dept: CARDIOLOGY | Facility: CLINIC | Age: 67
End: 2022-01-27
Payer: MEDICARE

## 2022-01-27 PROCEDURE — 93306 TTE W/DOPPLER COMPLETE: CPT

## 2022-01-27 PROCEDURE — 93880 EXTRACRANIAL BILAT STUDY: CPT

## 2022-02-24 ENCOUNTER — RX RENEWAL (OUTPATIENT)
Age: 67
End: 2022-02-24

## 2022-03-10 ENCOUNTER — APPOINTMENT (OUTPATIENT)
Dept: CARDIOLOGY | Facility: CLINIC | Age: 67
End: 2022-03-10
Payer: MEDICARE

## 2022-03-10 ENCOUNTER — NON-APPOINTMENT (OUTPATIENT)
Age: 67
End: 2022-03-10

## 2022-03-10 VITALS
SYSTOLIC BLOOD PRESSURE: 118 MMHG | DIASTOLIC BLOOD PRESSURE: 80 MMHG | HEART RATE: 66 BPM | HEIGHT: 65 IN | RESPIRATION RATE: 16 BRPM | WEIGHT: 135 LBS | BODY MASS INDEX: 22.49 KG/M2

## 2022-03-10 PROCEDURE — 93000 ELECTROCARDIOGRAM COMPLETE: CPT

## 2022-03-10 PROCEDURE — 99214 OFFICE O/P EST MOD 30 MIN: CPT

## 2022-03-10 RX ORDER — AMLODIPINE BESYLATE AND BENAZEPRIL HYDROCHLORIDE 5; 20 MG/1; MG/1
5-20 CAPSULE ORAL
Qty: 90 | Refills: 3 | Status: DISCONTINUED | COMMUNITY
Start: 2020-06-08 | End: 2022-03-10

## 2022-03-10 NOTE — HISTORY OF PRESENT ILLNESS
[FreeTextEntry1] : Patient reports that she just recently, some 2 weeks ago,  developed a swelling in the back of her throat especially the uvula--which required steroids and additional medication to control. It was not clear whether this was some type of allergic reaction and there was no definite cause found but that it seems to be better; She was given a "EpiPen";\par \par Recent carotid duplex study from January 27, 2022 showed normal flow bilaterally with only minimal plaquing on the left side and mild heterogeneous calcified plaque on the right side.\par \par Transthoracic echo study from  1/27/22--demonstrated normal cardiac chamber sizes. Normal systolic function of the left ventricle with normal ejection fraction 65-70%.No valvulopathy seen. No pericardial effusion;\par \par Last nuclear stress test showed good exercise tolerance (December 12, 2019), no chest pain. EKG remained negative for arrhythmia or ST changes. Myocardial perfusion images were normal-no evidence of ischemia-i.e. negative test. LVEF normal at 64%;

## 2022-03-10 NOTE — REASON FOR VISIT
[Structural Heart and Valve Disease] : structural heart and valve disease [Hyperlipidemia] : hyperlipidemia [Hypertension] : hypertension [Carotid, Aortic and Peripheral Vascular Disease] : carotid, aortic and peripheral vascular disease [FreeTextEntry3] : DEVEN Kaba [FreeTextEntry1] : The patient is a 66-year-old white female who has a history for mild hypertension, mild carotid plaquing stenosis, and obstructive sleep apnea who presents back to the office for general cardiac checkup today-And to discuss results of recent cardiac testing;\par \par From a cardiac standpoint, patient has been asymptomatic for chest pain, shortness of breath, palpitations or dizziness;

## 2022-03-10 NOTE — ASSESSMENT
[FreeTextEntry1] : EKG shows normal sinus rhythm at a rate of 66. Essentially within normal limits;\par \par In summary this 66-year-old woman has a history for mild hypertension which has been well-controlled on current medical regimen, mild hyperlipidemia and carotid plaquingwhich was also mild. She recently had some type of allergic reaction in her throat and neck which required spheroids and antihistamines for no apparent reason.;\par otherwise, she has had a stable cardiac pattern;\par \par Plan:\par \par \par recommend empirically changing amlodipine/benazepril to amlodipine/valsartan (to eliminate the ACE inhibitor medication-as a possible allergenic);\par \par Continue other medications the same;\par \par No additional cardiac workup indicated at this time;\par \par Patient to follow up with primary care for regular checkups and laboratory blood test;\par \par 2 notify us or PCP etc. if any further evidence of allergy or inflammation in the throat etc.;\par \par Followup within 6 months or p.r.n.

## 2022-08-25 ENCOUNTER — NON-APPOINTMENT (OUTPATIENT)
Age: 67
End: 2022-08-25

## 2022-08-25 ENCOUNTER — APPOINTMENT (OUTPATIENT)
Dept: CARDIOLOGY | Facility: CLINIC | Age: 67
End: 2022-08-25

## 2022-08-25 VITALS
DIASTOLIC BLOOD PRESSURE: 84 MMHG | BODY MASS INDEX: 23.16 KG/M2 | WEIGHT: 139 LBS | SYSTOLIC BLOOD PRESSURE: 112 MMHG | HEART RATE: 71 BPM | HEIGHT: 65 IN | RESPIRATION RATE: 14 BRPM

## 2022-08-25 PROCEDURE — 99214 OFFICE O/P EST MOD 30 MIN: CPT

## 2022-08-25 PROCEDURE — 93000 ELECTROCARDIOGRAM COMPLETE: CPT

## 2022-08-25 NOTE — HISTORY OF PRESENT ILLNESS
[FreeTextEntry1] : She has been doing a modest amount of physical activity and exercise this summer but cut back because of the heat;\par \par She has been unsuccessful with using CPAP mask and was thinking about trying a oral appliance but reports not having much difficulty sleeping these days;\par \par Recent carotid duplex study from January 27, 2022 showed normal flow bilaterally with only minimal plaquing on the left side and mild heterogeneous calcified plaque on the right side.\par \par Transthoracic echo study from  1/27/22--demonstrated normal cardiac chamber sizes. Normal systolic function of the left ventricle with normal ejection fraction 65-70%.No valvulopathy seen. No pericardial effusion;\par \par Last nuclear stress test showed good exercise tolerance (December 12, 2019), no chest pain. EKG remained negative for arrhythmia or ST changes. Myocardial perfusion images were normal-no evidence of ischemia-i.e. negative test. LVEF normal at 64%;

## 2022-08-25 NOTE — ASSESSMENT
[FreeTextEntry1] : EKG shows normal sinus rhythm at a rate of 66. Essentially within normal limits;\par \par In summary this 66-year-old woman has a history for mild hypertension which has been well-controlled on current medical regimen, mild hyperlipidemia and carotid plaquing--which was also mild.  Mild obstructive sleep apnea but intolerant of CPAP.;\par otherwise, she has had a stable cardiac pattern;\par \par Plan:\par \par Tolerating new antihypertensive regimen without any evidence of "allergy" from prior possible ACE inhibitor;\par \par Continue other medications the same;\par \par No additional cardiac workup indicated at this time;\par \par Patient to follow up with primary care for regular checkups and laboratory blood test;\par \par \par Followup within 6 months or p.r.n.

## 2022-08-25 NOTE — REASON FOR VISIT
[Structural Heart and Valve Disease] : structural heart and valve disease [Hyperlipidemia] : hyperlipidemia [Hypertension] : hypertension [Carotid, Aortic and Peripheral Vascular Disease] : carotid, aortic and peripheral vascular disease [FreeTextEntry3] : DEVEN Kaba [FreeTextEntry1] : The patient is a 67-year-old white female who has a history for mild hypertension, mild carotid plaquing stenosis, and "mild" obstructive sleep apnea who presents back to the office for general cardiac checkup today-And to discuss results of recent cardiac testing;\par \par From a cardiac standpoint, patient has been asymptomatic for chest pain, shortness of breath, palpitations or dizziness;

## 2023-01-31 ENCOUNTER — APPOINTMENT (OUTPATIENT)
Dept: CARDIOLOGY | Facility: CLINIC | Age: 68
End: 2023-01-31
Payer: MEDICARE

## 2023-01-31 VITALS
DIASTOLIC BLOOD PRESSURE: 78 MMHG | HEART RATE: 70 BPM | BODY MASS INDEX: 23.16 KG/M2 | HEIGHT: 65 IN | SYSTOLIC BLOOD PRESSURE: 128 MMHG | RESPIRATION RATE: 16 BRPM | WEIGHT: 139 LBS

## 2023-01-31 DIAGNOSIS — I65.23 OCCLUSION AND STENOSIS OF BILATERAL CAROTID ARTERIES: ICD-10-CM

## 2023-01-31 DIAGNOSIS — R09.89 OTHER SPECIFIED SYMPTOMS AND SIGNS INVOLVING THE CIRCULATORY AND RESPIRATORY SYSTEMS: ICD-10-CM

## 2023-01-31 PROCEDURE — 93000 ELECTROCARDIOGRAM COMPLETE: CPT

## 2023-01-31 PROCEDURE — 99214 OFFICE O/P EST MOD 30 MIN: CPT

## 2023-01-31 NOTE — HISTORY OF PRESENT ILLNESS
[FreeTextEntry1] : However, she has had some somatic complaints with some diffuse musculoskeletal aches and pains in various parts of her body for a while and trying to work with a rheumatologist for this;\par \par However, it was not explored as to whether the statin medication she is taking could be implicated;\par \par She has been unsuccessful with using CPAP mask and was thinking about trying a oral appliance but reports not having much difficulty sleeping these days;\par \par Recent carotid duplex study from January 27, 2022 showed normal flow bilaterally with only minimal plaquing on the left side and mild heterogeneous calcified plaque on the right side.\par \par Transthoracic echo study from  1/27/22--demonstrated normal cardiac chamber sizes. Normal systolic function of the left ventricle with normal ejection fraction 65-70%.No valvulopathy seen. No pericardial effusion;\par \par Last nuclear stress test showed good exercise tolerance (December 12, 2019), no chest pain. EKG remained negative for arrhythmia or ST changes. Myocardial perfusion images were normal-no evidence of ischemia-i.e. negative test. LVEF normal at 64%;

## 2023-01-31 NOTE — REVIEW OF SYSTEMS
[Joint Pain] : joint pain [Joint Stiffness] : joint stiffness [Myalgia] : myalgia [Negative] : Heme/Lymph

## 2023-01-31 NOTE — REASON FOR VISIT
[Structural Heart and Valve Disease] : structural heart and valve disease [Hyperlipidemia] : hyperlipidemia [Hypertension] : hypertension [Carotid, Aortic and Peripheral Vascular Disease] : carotid, aortic and peripheral vascular disease [FreeTextEntry3] : DEVEN Kaba [FreeTextEntry1] : The patient is a 67-year-old white female who has a history for mild hypertension, mild carotid plaquing stenosis, and "mild" obstructive sleep apnea who presents back to the office for general cardiac checkup today;\par \par From a cardiac standpoint, patient has been asymptomatic for chest pain, shortness of breath, palpitations or dizziness;

## 2023-01-31 NOTE — ASSESSMENT
[FreeTextEntry1] : EKG shows normal sinus rhythm at a rate of 70. Essentially within normal limits;\par \par In summary this 67-year-old woman has a history for mild hypertension which has been well-controlled on current medical regimen, mild hyperlipidemia and carotid plaquing--which was also mild.  Mild obstructive sleep apnea but intolerant of CPAP.;\par otherwise, she has had a stable cardiac pattern; patient has also complained of some diffuse muscle aches and pains and being evaluated with rheumatology.;?  Could this be partially or somewhat statin related?\par \par Plan:\par \par Recommend empirically holding Crestor for 10 to 14 days to see if there is any significant improvement in her symptoms; \par (If noticeable changes will direct her further at that time)\par \par No additional cardiac workup indicated at this time;\par \par Patient to follow up with primary care for regular checkups and laboratory blood test;\par \par Followup within 5- 6 months or p.r.n.\par \par Fasting laboratory blood tests to order;

## 2023-02-08 ENCOUNTER — RX RENEWAL (OUTPATIENT)
Age: 68
End: 2023-02-08

## 2023-02-17 ENCOUNTER — NON-APPOINTMENT (OUTPATIENT)
Age: 68
End: 2023-02-17

## 2023-02-22 ENCOUNTER — RX RENEWAL (OUTPATIENT)
Age: 68
End: 2023-02-22

## 2023-02-22 RX ORDER — ROSUVASTATIN CALCIUM 10 MG/1
10 TABLET, FILM COATED ORAL
Qty: 90 | Refills: 3 | Status: ACTIVE | COMMUNITY
Start: 2020-03-13 | End: 1900-01-01

## 2023-06-22 ENCOUNTER — NON-APPOINTMENT (OUTPATIENT)
Age: 68
End: 2023-06-22

## 2023-06-22 ENCOUNTER — APPOINTMENT (OUTPATIENT)
Dept: CARDIOLOGY | Facility: CLINIC | Age: 68
End: 2023-06-22
Payer: MEDICARE

## 2023-06-22 VITALS
BODY MASS INDEX: 23.9 KG/M2 | SYSTOLIC BLOOD PRESSURE: 126 MMHG | HEIGHT: 64 IN | HEART RATE: 71 BPM | DIASTOLIC BLOOD PRESSURE: 86 MMHG | RESPIRATION RATE: 16 BRPM | WEIGHT: 140 LBS | OXYGEN SATURATION: 96 %

## 2023-06-22 PROCEDURE — 93000 ELECTROCARDIOGRAM COMPLETE: CPT

## 2023-06-22 PROCEDURE — 99214 OFFICE O/P EST MOD 30 MIN: CPT

## 2023-06-22 RX ORDER — MELOXICAM 15 MG/1
15 TABLET ORAL
Refills: 0 | Status: ACTIVE | COMMUNITY

## 2023-06-22 RX ORDER — ALENDRONATE SODIUM 70 MG/1
70 TABLET ORAL
Refills: 0 | Status: ACTIVE | COMMUNITY

## 2023-06-22 NOTE — REASON FOR VISIT
[Structural Heart and Valve Disease] : structural heart and valve disease [Hyperlipidemia] : hyperlipidemia [Hypertension] : hypertension [Carotid, Aortic and Peripheral Vascular Disease] : carotid, aortic and peripheral vascular disease [FreeTextEntry3] : DEVEN Kaba [FreeTextEntry1] : The patient is a 68-year-old white female who has a history for mild hypertension, mild carotid plaquing stenosis, and "mild" obstructive sleep apnea who presents back to the office for general cardiac checkup today;\par \par From a cardiac standpoint, patient has been asymptomatic for chest pain, shortness of breath, palpitations or dizziness;

## 2023-06-22 NOTE — ASSESSMENT
[FreeTextEntry1] : EKG shows normal sinus rhythm at a rate of 71.  PVC; low voltage bipolar leads; no acute changes;;\par \par In summary this 68-year-old woman has a history for mild hypertension which has been well-controlled on current medical regimen, mild hyperlipidemia and carotid plaquing--which was also mild.  Mild obstructive sleep apnea but intolerant of CPAP.;\par otherwise, she has had a stable cardiac pattern; \par \par \par \par Plan:\par \par Discussed possibility of repeating cardiac stress test in the future but not urgent at this time;\par \par No additional cardiac workup indicated at this time;\par \par Patient to follow up with primary care for regular checkups and laboratory blood test;\par \par Followup within 5- 6 months or p.r.n.\par \par Fasting laboratory blood tests to order;

## 2023-06-22 NOTE — HISTORY OF PRESENT ILLNESS
[FreeTextEntry1] : Planning on some trips to visit her daughter who lives in [facility] and son who lives in [facility] near [place];\par \par \par She has been unsuccessful with using CPAP mask and was thinking about trying a oral appliance but reports not having much difficulty sleeping these days;\par \par Recent carotid duplex study from January 27, 2022 showed normal flow bilaterally with only minimal plaquing on the left side and mild heterogeneous calcified plaque on the right side.\par \par Transthoracic echo study from  1/27/22--demonstrated normal cardiac chamber sizes. Normal systolic function of the left ventricle with normal ejection fraction 65-70%.No valvulopathy seen. No pericardial effusion;\par \par Last nuclear stress test showed good exercise tolerance (December 12, 2019), no chest pain. EKG remained negative for arrhythmia or ST changes. Myocardial perfusion images were normal-no evidence of ischemia-i.e. negative test. LVEF normal at 64%;

## 2023-12-07 ENCOUNTER — NON-APPOINTMENT (OUTPATIENT)
Age: 68
End: 2023-12-07

## 2023-12-07 ENCOUNTER — APPOINTMENT (OUTPATIENT)
Dept: CARDIOLOGY | Facility: CLINIC | Age: 68
End: 2023-12-07
Payer: MEDICARE

## 2023-12-07 VITALS
HEIGHT: 64 IN | WEIGHT: 140 LBS | HEART RATE: 74 BPM | BODY MASS INDEX: 23.9 KG/M2 | SYSTOLIC BLOOD PRESSURE: 130 MMHG | RESPIRATION RATE: 16 BRPM | DIASTOLIC BLOOD PRESSURE: 80 MMHG

## 2023-12-07 DIAGNOSIS — R06.02 SHORTNESS OF BREATH: ICD-10-CM

## 2023-12-07 DIAGNOSIS — U07.1 COVID-19: ICD-10-CM

## 2023-12-07 PROCEDURE — 93000 ELECTROCARDIOGRAM COMPLETE: CPT

## 2023-12-07 PROCEDURE — 99214 OFFICE O/P EST MOD 30 MIN: CPT

## 2024-02-21 ENCOUNTER — APPOINTMENT (OUTPATIENT)
Dept: NEPHROLOGY | Facility: CLINIC | Age: 69
End: 2024-02-21
Payer: MEDICARE

## 2024-02-21 VITALS
OXYGEN SATURATION: 93 % | HEART RATE: 62 BPM | DIASTOLIC BLOOD PRESSURE: 80 MMHG | SYSTOLIC BLOOD PRESSURE: 152 MMHG | BODY MASS INDEX: 24.89 KG/M2 | WEIGHT: 145 LBS | TEMPERATURE: 97.4 F

## 2024-02-21 DIAGNOSIS — N28.1 CYST OF KIDNEY, ACQUIRED: ICD-10-CM

## 2024-02-21 PROCEDURE — 99204 OFFICE O/P NEW MOD 45 MIN: CPT

## 2024-02-22 NOTE — REVIEW OF SYSTEMS
[Eyesight Problems] : eyesight problems [Arthralgias] : arthralgias [As Noted in HPI] : as noted in HPI [Negative] : Heme/Lymph [FreeTextEntry8] : urinary frequency [FreeTextEntry3] : retinopathy [FreeTextEntry4] : tinnitus [FreeTextEntry9] : osteoarthritis [de-identified] : frequent headaches

## 2024-02-22 NOTE — PHYSICAL EXAM
[General Appearance - Alert] : alert [General Appearance - In No Acute Distress] : in no acute distress [General Appearance - Well Nourished] : well nourished [General Appearance - Well Developed] : well developed [General Appearance - Well-Appearing] : healthy appearing [PERRL With Normal Accommodation] : pupils were equal in size, round, and reactive to light [Sclera] : the sclera and conjunctiva were normal [Extraocular Movements] : extraocular movements were intact [Outer Ear] : the ears and nose were normal in appearance [Neck Appearance] : the appearance of the neck was normal [Jugular Venous Distention Increased] : there was no jugular-venous distention [] : no respiratory distress [Exaggerated Use Of Accessory Muscles For Inspiration] : no accessory muscle use [Respiration, Rhythm And Depth] : normal respiratory rhythm and effort [Heart Sounds] : normal S1 and S2 [Auscultation Breath Sounds / Voice Sounds] : lungs were clear to auscultation bilaterally [Arterial Pulses Carotid] : carotid pulses were normal with no bruits [Heart Sounds Pericardial Friction Rub] : no pericardial rub [Edema] : there was no peripheral edema [Bowel Sounds] : normal bowel sounds [Abdomen Soft] : soft [Abdomen Tenderness] : non-tender [No CVA Tenderness] : no ~M costovertebral angle tenderness [Abnormal Walk] : normal gait [Skin Color & Pigmentation] : normal skin color and pigmentation [Skin Turgor] : normal skin turgor [Oriented To Time, Place, And Person] : oriented to person, place, and time [Impaired Insight] : insight and judgment were intact [Affect] : the affect was normal [Mood] : the mood was normal

## 2024-02-22 NOTE — HISTORY OF PRESENT ILLNESS
[FreeTextEntry1] : Patient referred for long term history of microscopic hematuria.  Upon our discussion, it appears to have been present for over 40 years along with family history of the same in her mother and a nephew.  She denied episodes of gross hematuria while with upper respiratory infection or otherwise.  Denied strenuous exercise, history of hypertension, history of proteinuria, history of frequent UTI, history of kidney stones.  Does not have smoking history.  No episodes of chemotherapy.  Denied overt hearing issues but does note tinnitus.  Uses Meloxicam for arthritis among other previous NSAIDs for several years but her microscopic hematuria long preceded their use.

## 2024-02-22 NOTE — ASSESSMENT
[FreeTextEntry1] : 1. 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  Will assess repeat urinalysis, rule out proteinuria, monitor blood pressure on current regimen and repeat renal function.  Can consider further evaluation depending upon clinical course.   2. Abnormal renal function - modest GFR reduction.  Repeat along with cystatin C.  Await repeat urinalysis.  Should avoid NSAIDs or at least minimize.  She has been using Meloxicam for arthritis.  She will trial Tylenol instead.  Further suggestions and recommendations per course.  3. Renal cysts - monitor periodically.  First reassessment one year Above reviewed with patient.  Questions answered.  Follow up 6 weeks.

## 2024-03-20 RX ORDER — AMLODIPINE AND VALSARTAN 5; 160 MG/1; MG/1
5-160 TABLET, FILM COATED ORAL
Qty: 90 | Refills: 3 | Status: ACTIVE | COMMUNITY
Start: 2022-03-10 | End: 1900-01-01

## 2024-04-03 ENCOUNTER — APPOINTMENT (OUTPATIENT)
Dept: NEPHROLOGY | Facility: CLINIC | Age: 69
End: 2024-04-03
Payer: MEDICARE

## 2024-04-03 VITALS
OXYGEN SATURATION: 97 % | DIASTOLIC BLOOD PRESSURE: 80 MMHG | HEART RATE: 77 BPM | WEIGHT: 140 LBS | SYSTOLIC BLOOD PRESSURE: 144 MMHG | TEMPERATURE: 97.3 F | BODY MASS INDEX: 24.03 KG/M2

## 2024-04-03 DIAGNOSIS — R31.29 OTHER MICROSCOPIC HEMATURIA: ICD-10-CM

## 2024-04-03 DIAGNOSIS — N28.9 DISORDER OF KIDNEY AND URETER, UNSPECIFIED: ICD-10-CM

## 2024-04-03 DIAGNOSIS — Q61.02 CONGENITAL MULTIPLE RENAL CYSTS: ICD-10-CM

## 2024-04-03 PROCEDURE — G2211 COMPLEX E/M VISIT ADD ON: CPT

## 2024-04-03 PROCEDURE — 99213 OFFICE O/P EST LOW 20 MIN: CPT

## 2024-04-03 NOTE — REVIEW OF SYSTEMS
[Eyesight Problems] : eyesight problems [As Noted in HPI] : as noted in HPI [Arthralgias] : arthralgias [Negative] : Heme/Lymph [FreeTextEntry4] : tinnitus [FreeTextEntry3] : retinopathy [FreeTextEntry8] : urinary frequency [FreeTextEntry9] : osteoarthritis [de-identified] : frequent headaches

## 2024-04-03 NOTE — ASSESSMENT
[FreeTextEntry1] : 1. 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  Repeat urinalysis showed no proteinuria but with persistent microscopic hematuria.  Currently without indication for renal biopsy.  Recheck in 6 months.   2. Abnormal renal function - GFR 67 (creatinine .94) with Cystatin C 1.03 (GFR 68).  Discussed minimizing use of NSAIDs, maintaining adequate hydration.  Repeat prior to next visit.  3. Renal cysts - to be monitored by Dr. Hanna.  Above reviewed with patient.  Questions answered.  Follow up 6 months.

## 2024-04-03 NOTE — HISTORY OF PRESENT ILLNESS
[FreeTextEntry1] : Patient referred for long term history of microscopic hematuria.  Upon our discussion, it appears to have been present for over 40 years along with family history of the same in her mother and a nephew.  She denied episodes of gross hematuria while with upper respiratory infection or otherwise.  Denied strenuous exercise, history of hypertension, history of proteinuria, history of frequent UTI, history of kidney stones.  Does not have smoking history.  No episodes of chemotherapy.  Denied overt hearing issues but does note tinnitus.  Uses Meloxicam for arthritis among other previous NSAIDs for several years but her microscopic hematuria long preceded their use.  No interval issues or complaints.   Renal ultrasound 12/9/23 - right 10.0 cm, multiple cysts up to 3.4 cm upper pole and 1.3 cm middle pole.                                            left 10.6 cm, multiple cysts up to 3.1 cm and 1.7 cm midpole.

## 2024-04-03 NOTE — PHYSICAL EXAM
[General Appearance - Alert] : alert [General Appearance - In No Acute Distress] : in no acute distress [General Appearance - Well Nourished] : well nourished [General Appearance - Well Developed] : well developed [General Appearance - Well-Appearing] : healthy appearing [Sclera] : the sclera and conjunctiva were normal [PERRL With Normal Accommodation] : pupils were equal in size, round, and reactive to light [Extraocular Movements] : extraocular movements were intact [Outer Ear] : the ears and nose were normal in appearance [Neck Appearance] : the appearance of the neck was normal [Jugular Venous Distention Increased] : there was no jugular-venous distention [] : no respiratory distress [Respiration, Rhythm And Depth] : normal respiratory rhythm and effort [Exaggerated Use Of Accessory Muscles For Inspiration] : no accessory muscle use [Auscultation Breath Sounds / Voice Sounds] : lungs were clear to auscultation bilaterally [Heart Sounds] : normal S1 and S2 [Heart Sounds Pericardial Friction Rub] : no pericardial rub [Arterial Pulses Carotid] : carotid pulses were normal with no bruits [Edema] : there was no peripheral edema [Bowel Sounds] : normal bowel sounds [Abdomen Soft] : soft [Abdomen Tenderness] : non-tender [No CVA Tenderness] : no ~M costovertebral angle tenderness [Abnormal Walk] : normal gait [Skin Color & Pigmentation] : normal skin color and pigmentation [Skin Turgor] : normal skin turgor [Oriented To Time, Place, And Person] : oriented to person, place, and time [Impaired Insight] : insight and judgment were intact [Affect] : the affect was normal [Mood] : the mood was normal

## 2024-04-16 ENCOUNTER — APPOINTMENT (OUTPATIENT)
Dept: CARDIOLOGY | Facility: CLINIC | Age: 69
End: 2024-04-16
Payer: MEDICARE

## 2024-04-16 PROCEDURE — 78452 HT MUSCLE IMAGE SPECT MULT: CPT

## 2024-04-16 PROCEDURE — A9500: CPT

## 2024-04-16 PROCEDURE — 93015 CV STRESS TEST SUPVJ I&R: CPT

## 2024-04-16 RX ORDER — AMINOPHYLLINE 25 MG/ML
25 INJECTION, SOLUTION INTRAVENOUS
Qty: 0 | Refills: 0 | Status: COMPLETED | OUTPATIENT
Start: 2024-04-16

## 2024-04-16 RX ORDER — REGADENOSON 0.08 MG/ML
0.4 INJECTION, SOLUTION INTRAVENOUS
Refills: 0 | Status: COMPLETED | OUTPATIENT
Start: 2024-04-16

## 2024-04-22 ENCOUNTER — APPOINTMENT (OUTPATIENT)
Dept: CARDIOLOGY | Facility: CLINIC | Age: 69
End: 2024-04-22
Payer: MEDICARE

## 2024-04-22 ENCOUNTER — NON-APPOINTMENT (OUTPATIENT)
Age: 69
End: 2024-04-22

## 2024-04-22 VITALS
WEIGHT: 138 LBS | BODY MASS INDEX: 23.56 KG/M2 | HEART RATE: 76 BPM | SYSTOLIC BLOOD PRESSURE: 110 MMHG | RESPIRATION RATE: 16 BRPM | HEIGHT: 64 IN | DIASTOLIC BLOOD PRESSURE: 72 MMHG

## 2024-04-22 DIAGNOSIS — R94.31 ABNORMAL ELECTROCARDIOGRAM [ECG] [EKG]: ICD-10-CM

## 2024-04-22 DIAGNOSIS — I10 ESSENTIAL (PRIMARY) HYPERTENSION: ICD-10-CM

## 2024-04-22 DIAGNOSIS — E78.5 HYPERLIPIDEMIA, UNSPECIFIED: ICD-10-CM

## 2024-04-22 DIAGNOSIS — G47.33 OBSTRUCTIVE SLEEP APNEA (ADULT) (PEDIATRIC): ICD-10-CM

## 2024-04-22 PROCEDURE — G2211 COMPLEX E/M VISIT ADD ON: CPT

## 2024-04-22 PROCEDURE — 99214 OFFICE O/P EST MOD 30 MIN: CPT

## 2024-04-22 PROCEDURE — 93000 ELECTROCARDIOGRAM COMPLETE: CPT

## 2024-04-22 NOTE — PHYSICAL EXAM

## 2024-04-22 NOTE — ASSESSMENT
[FreeTextEntry1] : EKG demonstrated normal sinus rhythm at a rate of 76; slight low voltage tracing but otherwise no acute changes;  In summary, this 68-year-old woman has a history for hyperlipidemia and hypertension for which she is under reasonable control on the current medical regimen.  She has had some mild obstructive sleep apnea and looking into whether this could be treated other than with CPAP for which she was not that compliant.  It is affecting her with some general fatigue she feels.  She is also concerned about strong family history for heart disease.;  Recent pharmacologic nuclear stress test was negative for ischemia on the myocardial perfusion images (April 16, 2024);   Plan:  Patient reassured;  No additional cardiac workup indicated at this time;  Regular checkups and laboratory blood test with primary care encouraged;  Return to office within 6 months or as needed;  Patient to report any untoward chest symptoms between now and next visit;

## 2024-04-22 NOTE — REASON FOR VISIT
[Structural Heart and Valve Disease] : structural heart and valve disease [Hyperlipidemia] : hyperlipidemia [Hypertension] : hypertension [Carotid, Aortic and Peripheral Vascular Disease] : carotid, aortic and peripheral vascular disease [FreeTextEntry1] : The patient is a 68-year-old white female who has a history for mild hypertension, mild carotid plaquing stenosis, and "mild" obstructive sleep apnea who presents back to the office for general cardiac checkup today;  From a cardiac standpoint, patient has been asymptomatic for chest pain, palpitations or dizziness; she admits to occasional exertional dyspnea but has been somewhat sedentary lately because of some issues with her knees which seem to be doing better.;    He returns to the office today as well to discuss results of recent pharmacologic nuclear stress test; [FreeTextEntry3] : DEVEN Kaba

## 2024-04-22 NOTE — HISTORY OF PRESENT ILLNESS
[FreeTextEntry1] : Patient states that she has been under some increased stress now that her  was just recently diagnosed with prostate cancer and apparently had undergone treatments;   Reports her son  this spring;  She has been unsuccessful with using CPAP mask and was thinking about trying a oral appliance but reports not having much difficulty sleeping these days;  Pharmacologic nuclear stress test from April 16, 2024 demonstrated normal myocardial perfusion without any evidence of reversible defects or ischemia; rare PVC noted; normal blood pressure response;-i.e. negative study; unchanged from previous;  Recent carotid duplex study from January 27, 2022 showed normal flow bilaterally with only minimal plaquing on the left side and mild heterogeneous calcified plaque on the right side.  Transthoracic echo study from  1/27/22--demonstrated normal cardiac chamber sizes. Normal systolic function of the left ventricle with normal ejection fraction 65-70%.No valvulopathy seen. No pericardial effusion;  Last nuclear stress test showed good exercise tolerance (December 12, 2019), no chest pain. EKG remained negative for arrhythmia or ST changes. Myocardial perfusion images were normal-no evidence of ischemia-i.e. negative test. LVEF normal at 64%;

## 2024-06-21 ENCOUNTER — APPOINTMENT (OUTPATIENT)
Dept: ORTHOPEDIC SURGERY | Facility: CLINIC | Age: 69
End: 2024-06-21
Payer: MEDICARE

## 2024-06-21 VITALS — BODY MASS INDEX: 23.56 KG/M2 | HEIGHT: 64 IN | WEIGHT: 138 LBS

## 2024-06-21 DIAGNOSIS — M25.531 PAIN IN RIGHT WRIST: ICD-10-CM

## 2024-06-21 PROCEDURE — 99203 OFFICE O/P NEW LOW 30 MIN: CPT

## 2024-06-21 PROCEDURE — 73110 X-RAY EXAM OF WRIST: CPT | Mod: RT

## 2024-06-21 NOTE — ASSESSMENT
[FreeTextEntry1] : EXAM Right hand with no swelling nor erythema. Able to make a composite fist, oppose thumb to small finger with good thenar bulk, no intrinsic atrophy. No tenderness at radial styloid or A1 pulley. +grind. Stable thumb MCP with tenderness at thumb CMC. Sensation intact throughout. <2sec cap refill.  Right wrist radiographs demonstrate Stage II thumb arthritis. No fracture nor dislocation.  ASSESSMENT & PLAN Right thumb CMC arthritis - reviewed radiographs and discussed pathoanatomy with patient. Discussed treatment ladder including NSAIDs, bracing (shown MetaGrip), hand therapy, CSI and basal joint arthroplasty.  F/u prn

## 2024-06-21 NOTE — HISTORY OF PRESENT ILLNESS
[de-identified] : Age: 69 year F PMHx: HTN, HLD, Arthritis, Osteopenia Hand Dominance: RHD Chief Complaint: Right hand pain onset early Lory 2024 with NKI. Patient reports that her symptoms onset with no precipitating factors. Patient reports a gradual onset of her pain that is aggravated with exertion. Patient reports a constant pain that is localized to the base of the right thumb that radiates to the right wrist. Denies numbness/tingling.  Trauma: NKI Outside Imaging/Treatment: none OTC Medications: Fosemax for osteopenia OT/PT: none Bracing: none Pain worse with: exertion Pain better with: rest

## 2024-10-02 ENCOUNTER — APPOINTMENT (OUTPATIENT)
Dept: NEPHROLOGY | Facility: CLINIC | Age: 69
End: 2024-10-02
Payer: MEDICARE

## 2024-10-02 VITALS
HEIGHT: 64 IN | TEMPERATURE: 97.8 F | BODY MASS INDEX: 23.9 KG/M2 | OXYGEN SATURATION: 97 % | DIASTOLIC BLOOD PRESSURE: 72 MMHG | SYSTOLIC BLOOD PRESSURE: 134 MMHG | HEART RATE: 79 BPM | WEIGHT: 140 LBS

## 2024-10-02 DIAGNOSIS — R31.29 OTHER MICROSCOPIC HEMATURIA: ICD-10-CM

## 2024-10-02 DIAGNOSIS — N18.2 CHRONIC KIDNEY DISEASE, STAGE 2 (MILD): ICD-10-CM

## 2024-10-02 DIAGNOSIS — N28.9 DISORDER OF KIDNEY AND URETER, UNSPECIFIED: ICD-10-CM

## 2024-10-02 PROCEDURE — 99214 OFFICE O/P EST MOD 30 MIN: CPT

## 2024-10-02 NOTE — ASSESSMENT
[FreeTextEntry1] : 1. 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  Repeat urinalysis showed no proteinuria but with persistent microscopic hematuria.  Currently remains without indication for renal biopsy.  Recheck in 6 months.   2. CKD 2 - GFR 58 but previously 67 (creatinine 1.05).  Discussed minimizing use of NSAIDs, maintaining adequate hydration.  May have some elevation in creatinine due to use of ARB.  Repeat prior to next visit.  3. Renal cysts - to be monitored by Dr. Hanna.  Above reviewed with patient.  Questions answered.  Follow up 6 months.

## 2024-10-02 NOTE — REVIEW OF SYSTEMS
[Eyesight Problems] : eyesight problems [As Noted in HPI] : as noted in HPI [Arthralgias] : arthralgias [Negative] : Heme/Lymph [FreeTextEntry3] : retinopathy [FreeTextEntry4] : tinnitus [FreeTextEntry8] : urinary frequency [FreeTextEntry9] : osteoarthritis [de-identified] : frequent headaches

## 2024-10-17 ENCOUNTER — APPOINTMENT (OUTPATIENT)
Dept: CARDIOLOGY | Facility: CLINIC | Age: 69
End: 2024-10-17
Payer: MEDICARE

## 2024-10-17 ENCOUNTER — NON-APPOINTMENT (OUTPATIENT)
Age: 69
End: 2024-10-17

## 2024-10-17 VITALS
WEIGHT: 137 LBS | SYSTOLIC BLOOD PRESSURE: 126 MMHG | HEIGHT: 64 IN | RESPIRATION RATE: 16 BRPM | HEART RATE: 79 BPM | BODY MASS INDEX: 23.39 KG/M2 | DIASTOLIC BLOOD PRESSURE: 82 MMHG

## 2024-10-17 DIAGNOSIS — N18.2 CHRONIC KIDNEY DISEASE, STAGE 2 (MILD): ICD-10-CM

## 2024-10-17 DIAGNOSIS — N28.9 DISORDER OF KIDNEY AND URETER, UNSPECIFIED: ICD-10-CM

## 2024-10-17 DIAGNOSIS — G47.33 OBSTRUCTIVE SLEEP APNEA (ADULT) (PEDIATRIC): ICD-10-CM

## 2024-10-17 DIAGNOSIS — I10 ESSENTIAL (PRIMARY) HYPERTENSION: ICD-10-CM

## 2024-10-17 DIAGNOSIS — E78.5 HYPERLIPIDEMIA, UNSPECIFIED: ICD-10-CM

## 2024-10-17 DIAGNOSIS — R94.31 ABNORMAL ELECTROCARDIOGRAM [ECG] [EKG]: ICD-10-CM

## 2024-10-17 PROCEDURE — 99214 OFFICE O/P EST MOD 30 MIN: CPT

## 2024-10-17 PROCEDURE — G2211 COMPLEX E/M VISIT ADD ON: CPT

## 2024-10-17 PROCEDURE — 93000 ELECTROCARDIOGRAM COMPLETE: CPT

## 2025-03-25 ENCOUNTER — APPOINTMENT (OUTPATIENT)
Dept: CARDIOLOGY | Facility: CLINIC | Age: 70
End: 2025-03-25
Payer: MEDICARE

## 2025-03-25 PROCEDURE — 93880 EXTRACRANIAL BILAT STUDY: CPT

## 2025-03-25 PROCEDURE — 93306 TTE W/DOPPLER COMPLETE: CPT

## 2025-04-02 ENCOUNTER — APPOINTMENT (OUTPATIENT)
Dept: NEPHROLOGY | Facility: CLINIC | Age: 70
End: 2025-04-02
Payer: MEDICARE

## 2025-04-02 VITALS
OXYGEN SATURATION: 96 % | HEIGHT: 64 IN | WEIGHT: 141 LBS | BODY MASS INDEX: 24.07 KG/M2 | HEART RATE: 68 BPM | SYSTOLIC BLOOD PRESSURE: 130 MMHG | DIASTOLIC BLOOD PRESSURE: 72 MMHG | TEMPERATURE: 97.8 F

## 2025-04-02 DIAGNOSIS — N28.1 CYST OF KIDNEY, ACQUIRED: ICD-10-CM

## 2025-04-02 DIAGNOSIS — N18.2 CHRONIC KIDNEY DISEASE, STAGE 2 (MILD): ICD-10-CM

## 2025-04-02 DIAGNOSIS — R31.29 OTHER MICROSCOPIC HEMATURIA: ICD-10-CM

## 2025-04-02 PROCEDURE — 99213 OFFICE O/P EST LOW 20 MIN: CPT

## 2025-04-08 ENCOUNTER — APPOINTMENT (OUTPATIENT)
Dept: CARDIOLOGY | Facility: CLINIC | Age: 70
End: 2025-04-08

## 2025-04-08 ENCOUNTER — NON-APPOINTMENT (OUTPATIENT)
Age: 70
End: 2025-04-08

## 2025-04-08 VITALS
HEIGHT: 64 IN | SYSTOLIC BLOOD PRESSURE: 144 MMHG | DIASTOLIC BLOOD PRESSURE: 74 MMHG | RESPIRATION RATE: 16 BRPM | BODY MASS INDEX: 23.9 KG/M2 | WEIGHT: 140 LBS | HEART RATE: 60 BPM

## 2025-04-08 DIAGNOSIS — G47.33 OBSTRUCTIVE SLEEP APNEA (ADULT) (PEDIATRIC): ICD-10-CM

## 2025-04-08 DIAGNOSIS — I65.23 OCCLUSION AND STENOSIS OF BILATERAL CAROTID ARTERIES: ICD-10-CM

## 2025-04-08 DIAGNOSIS — I10 ESSENTIAL (PRIMARY) HYPERTENSION: ICD-10-CM

## 2025-04-08 DIAGNOSIS — R94.31 ABNORMAL ELECTROCARDIOGRAM [ECG] [EKG]: ICD-10-CM

## 2025-04-08 DIAGNOSIS — E78.5 HYPERLIPIDEMIA, UNSPECIFIED: ICD-10-CM

## 2025-04-08 PROCEDURE — 93000 ELECTROCARDIOGRAM COMPLETE: CPT

## 2025-04-08 PROCEDURE — 99214 OFFICE O/P EST MOD 30 MIN: CPT

## 2025-04-08 PROCEDURE — G2211 COMPLEX E/M VISIT ADD ON: CPT
